# Patient Record
(demographics unavailable — no encounter records)

---

## 2024-12-11 NOTE — HISTORY OF PRESENT ILLNESS
[de-identified] : 66-year-old patient who comes in to see me for his right foot fracture.  The exam history and flexion of plan were performed utilizing a certified [place]  along with having his daughter present.  Currently his pain is well-controlled.  Localizes the discomfort he does have to the heel itself.

## 2024-12-11 NOTE — DATA REVIEWED
[FreeTextEntry1] : 2 views of the patient's right calcaneus ordered reviewed by me personally.  I also reviewed the patient's CT scan as well as x-rays done at the hospital.  No evidence of any interval displacement.  There is comminution of the fracture but no significant displacement.  Boehler's angle normal.

## 2025-01-16 NOTE — PHYSICAL EXAM
[de-identified] : Cast was removed.  Skin is intact.  No deformity present.  Nontender.  Grossly intact range of motion of the ankle and hindfoot.  He is neurovascularly intact.

## 2025-01-16 NOTE — HISTORY OF PRESENT ILLNESS
[de-identified] : Patient comes today for follow-up of his right calcaneus fracture accompanied by his daughter.  He has been in a cast and has been nonweightbearing as directed.  He has no pain.  Denies any calf pain chest pain or shortness of breath.

## 2025-01-16 NOTE — DATA REVIEWED
[FreeTextEntry1] : 2 views of the patient's right calcaneus ordered reviewed by me personally.  X-rays demonstrate evidence of maintained Boehler's angle and no shifted fracture fragments.

## 2025-01-16 NOTE — DISCUSSION/SUMMARY
[de-identified] : I reassured the patient and his daughter that he is doing well with fracture care.  We will continue with nonoperative fracture care.  He will begin weightbearing in a postop short cam boot.  He will work on passive and active range of motion of the ankle in the meantime.  I will see him back in 1 month for his next fracture care appointment.

## 2025-02-26 NOTE — DISCUSSION/SUMMARY
[de-identified] : I reassured the patient and his daughter that he is doing excellent for this point the postinjury course.  I would like him at this time to continue with activity as tolerated without restrictions.  Can gradually resume impact activity.  All questions sought and answered.

## 2025-02-26 NOTE — PHYSICAL EXAM
[de-identified] : Hindfoot position is anatomic.  Nontender at the calcaneal fracture site.  Grossly intact range of motion.  Walking with a very mild antalgic gait.

## 2025-02-26 NOTE — HISTORY OF PRESENT ILLNESS
[de-identified] : Patient is here for follow-up of his right calcaneus fracture.  He is walking.  He is walking without assistive device.  He denies any significant pain.

## 2025-02-26 NOTE — DATA REVIEWED
[FreeTextEntry1] : 2 views of the patient's right calcaneus ordered reviewed by me personally.  X-rays demonstrate evidence of a healed calcaneus fracture without collapse.